# Patient Record
Sex: FEMALE | NOT HISPANIC OR LATINO
[De-identification: names, ages, dates, MRNs, and addresses within clinical notes are randomized per-mention and may not be internally consistent; named-entity substitution may affect disease eponyms.]

---

## 2020-06-30 PROBLEM — Z78.9 NON-SMOKER: Status: ACTIVE | Noted: 2020-06-30

## 2020-07-01 ENCOUNTER — APPOINTMENT (OUTPATIENT)
Dept: SURGICAL ONCOLOGY | Facility: CLINIC | Age: 40
End: 2020-07-01
Payer: MEDICAID

## 2020-07-01 VITALS
SYSTOLIC BLOOD PRESSURE: 113 MMHG | DIASTOLIC BLOOD PRESSURE: 76 MMHG | RESPIRATION RATE: 14 BRPM | HEART RATE: 87 BPM | OXYGEN SATURATION: 99 %

## 2020-07-01 VITALS — TEMPERATURE: 96.6 F

## 2020-07-01 DIAGNOSIS — Z78.9 OTHER SPECIFIED HEALTH STATUS: ICD-10-CM

## 2020-07-01 PROCEDURE — 99205 OFFICE O/P NEW HI 60 MIN: CPT

## 2020-07-01 NOTE — PHYSICAL EXAM
[Normal] : supple, no neck mass and thyroid not enlarged [Normal Supraclavicular Lymph Nodes] : normal supraclavicular lymph nodes [Normal Axillary Lymph Nodes] : normal axillary lymph nodes [Normal] : oriented to person, place and time, with appropriate affect [FreeTextEntry1] : DT was present [de-identified] : Vaguely bordered Right UIQ breast mass from 12 to 3, left wnl. [de-identified] : Normal S1, S2.  Regular rate and rhythm.  [de-identified] : Clear breath sounds bilaterally, normal respiratory effort.

## 2020-07-01 NOTE — HISTORY OF PRESENT ILLNESS
[de-identified] : 39 year-old female presents for an initial postop visit.  She was referred for breast imaging in June 2020 to evaluate a palpable right breast mass.  Mammogram revealed a 2.5 cm spiculated pass in the area of palpable concern with overlying skin thickening and retraction.  A 7 mm nodule overlies the lower portion of the right pectoralis muscle, possibly representing a satellite lesion or abnormal lymph node.  Prominent right axillary lymph nodes are present.  Breast ultrasound revealed a 2.5 cm mass at 2:00-3:00, 5 cmfn, corresponding to the mammographic mass.  In the right axillary tail there is a 1.3 cm nodule concerning for a satellite lesion vs. abnormal lymph node, as seem on mammogram as well.  There are abnormal axillary lymph nodes ranging up to 2.3 cm (BIRADS 5). \par \par Ultrasound-guided biopsy of both the mass and lymph node were performed, an fInal pathology was poorly differentiated invasive ductal carcinoma, metastatic to the right axillary lymph node (ER+KY+/HER2 equivocal, FISH pending).

## 2020-07-01 NOTE — CONSULT LETTER
[Dear  ___] : Dear  [unfilled], [Consult Letter:] : I had the pleasure of evaluating your patient, [unfilled]. [Sincerely,] : Sincerely, [Please see my note below.] : Please see my note below. [Consult Closing:] : Thank you very much for allowing me to participate in the care of this patient.  If you have any questions, please do not hesitate to contact me. [FreeTextEntry3] : Ben Prince M.D.\par  [FreeTextEntry2] : Grabiel Sherman MD

## 2020-07-01 NOTE — ASSESSMENT
[FreeTextEntry1] : IMP:\par Newly diagnosed right breast cancer metastatic to right axillary lymph node\par Question of satellite lesion in the right axillary tail\par \par The patient and I discussed the surgical management of breast cancer. I explained that breast cancer can be treated with 2 main surgical approaches. One is breast conserving therapy. The other is mastectomy with or without immediate reconstruction by a plastic surgeon. Breast conserving therapy involves wide excision of the involved area. Negative margins must be achieved with this wide excision. In addition, evaluation of the lymph nodes either with a sentinel lymph node biopsy or an axillary lymph node dissection may be required. This treatment usually requires postoperative radiation to the breast. Treatment with mastectomy also involves evaluation of the lymph node with a sentinel lymph node biopsy or axillary lymph node dissection. Post operative radiation therapy may be needed even after mastectomy in certain advanced cases.\par \par \par PLAN:\par MRI for extent of disease evaluation\par Medical oncology referral for ? neoadjuvant chemotherapy\par Genetic testing

## 2020-07-06 ENCOUNTER — OUTPATIENT (OUTPATIENT)
Dept: OUTPATIENT SERVICES | Facility: HOSPITAL | Age: 40
LOS: 1 days | Discharge: HOME | End: 2020-07-06

## 2020-07-06 DIAGNOSIS — Z85.3 PERSONAL HISTORY OF MALIGNANT NEOPLASM OF BREAST: ICD-10-CM

## 2020-07-07 ENCOUNTER — APPOINTMENT (OUTPATIENT)
Dept: HEMATOLOGY ONCOLOGY | Facility: CLINIC | Age: 40
End: 2020-07-07
Payer: MEDICAID

## 2020-07-07 PROCEDURE — 99205 OFFICE O/P NEW HI 60 MIN: CPT | Mod: 95

## 2020-07-13 ENCOUNTER — APPOINTMENT (OUTPATIENT)
Dept: NUCLEAR MEDICINE | Facility: IMAGING CENTER | Age: 40
End: 2020-07-13

## 2020-07-13 ENCOUNTER — APPOINTMENT (OUTPATIENT)
Dept: CARDIOLOGY | Facility: CLINIC | Age: 40
End: 2020-07-13

## 2020-07-14 ENCOUNTER — APPOINTMENT (OUTPATIENT)
Dept: HUMAN REPRODUCTION | Facility: CLINIC | Age: 40
End: 2020-07-14

## 2020-07-16 ENCOUNTER — APPOINTMENT (OUTPATIENT)
Dept: CARDIOLOGY | Facility: CLINIC | Age: 40
End: 2020-07-16
Payer: MEDICAID

## 2020-07-16 PROCEDURE — 93356 MYOCRD STRAIN IMG SPCKL TRCK: CPT

## 2020-07-16 PROCEDURE — 93306 TTE W/DOPPLER COMPLETE: CPT

## 2020-07-17 ENCOUNTER — APPOINTMENT (OUTPATIENT)
Dept: NUCLEAR MEDICINE | Facility: IMAGING CENTER | Age: 40
End: 2020-07-17
Payer: MEDICAID

## 2020-07-17 ENCOUNTER — RESULT REVIEW (OUTPATIENT)
Age: 40
End: 2020-07-17

## 2020-07-17 ENCOUNTER — APPOINTMENT (OUTPATIENT)
Dept: CT IMAGING | Facility: IMAGING CENTER | Age: 40
End: 2020-07-17
Payer: MEDICAID

## 2020-07-17 ENCOUNTER — OUTPATIENT (OUTPATIENT)
Dept: OUTPATIENT SERVICES | Facility: HOSPITAL | Age: 40
LOS: 1 days | End: 2020-07-17
Payer: MEDICAID

## 2020-07-17 DIAGNOSIS — C50.911 MALIGNANT NEOPLASM OF UNSPECIFIED SITE OF RIGHT FEMALE BREAST: ICD-10-CM

## 2020-07-17 PROCEDURE — 78306 BONE IMAGING WHOLE BODY: CPT

## 2020-07-17 PROCEDURE — 78830 RP LOCLZJ TUM SPECT W/CT 1: CPT | Mod: 26

## 2020-07-17 PROCEDURE — 74177 CT ABD & PELVIS W/CONTRAST: CPT

## 2020-07-17 PROCEDURE — A9561: CPT

## 2020-07-17 PROCEDURE — 71260 CT THORAX DX C+: CPT | Mod: 26

## 2020-07-17 PROCEDURE — 74177 CT ABD & PELVIS W/CONTRAST: CPT | Mod: 26

## 2020-07-17 PROCEDURE — 78830 RP LOCLZJ TUM SPECT W/CT 1: CPT

## 2020-07-17 PROCEDURE — 71260 CT THORAX DX C+: CPT

## 2020-07-17 PROCEDURE — 78306 BONE IMAGING WHOLE BODY: CPT | Mod: 26

## 2020-07-20 ENCOUNTER — APPOINTMENT (OUTPATIENT)
Dept: HEMATOLOGY ONCOLOGY | Facility: CLINIC | Age: 40
End: 2020-07-20
Payer: MEDICAID

## 2020-07-20 PROCEDURE — 99442: CPT

## 2020-07-21 ENCOUNTER — APPOINTMENT (OUTPATIENT)
Dept: CARDIOLOGY | Facility: CLINIC | Age: 40
End: 2020-07-21
Payer: MEDICAID

## 2020-07-21 DIAGNOSIS — C50.911 MALIGNANT NEOPLASM OF UNSPECIFIED SITE OF RIGHT FEMALE BREAST: ICD-10-CM

## 2020-07-21 DIAGNOSIS — Z00.00 ENCOUNTER FOR GENERAL ADULT MEDICAL EXAMINATION W/OUT ABNORMAL FINDINGS: ICD-10-CM

## 2020-07-21 PROCEDURE — 99202 OFFICE O/P NEW SF 15 MIN: CPT | Mod: 95

## 2020-07-22 ENCOUNTER — APPOINTMENT (OUTPATIENT)
Dept: MRI IMAGING | Facility: IMAGING CENTER | Age: 40
End: 2020-07-22
Payer: MEDICAID

## 2020-07-22 ENCOUNTER — OUTPATIENT (OUTPATIENT)
Dept: OUTPATIENT SERVICES | Facility: HOSPITAL | Age: 40
LOS: 1 days | End: 2020-07-22
Payer: MEDICAID

## 2020-07-22 ENCOUNTER — RESULT REVIEW (OUTPATIENT)
Age: 40
End: 2020-07-22

## 2020-07-22 ENCOUNTER — APPOINTMENT (OUTPATIENT)
Dept: HEMATOLOGY ONCOLOGY | Facility: CLINIC | Age: 40
End: 2020-07-22
Payer: MEDICAID

## 2020-07-22 DIAGNOSIS — C50.911 MALIGNANT NEOPLASM OF UNSPECIFIED SITE OF RIGHT FEMALE BREAST: ICD-10-CM

## 2020-07-22 PROCEDURE — 72197 MRI PELVIS W/O & W/DYE: CPT

## 2020-07-22 PROCEDURE — 74183 MRI ABD W/O CNTR FLWD CNTR: CPT | Mod: 26

## 2020-07-22 PROCEDURE — 74183 MRI ABD W/O CNTR FLWD CNTR: CPT

## 2020-07-22 PROCEDURE — 72197 MRI PELVIS W/O & W/DYE: CPT | Mod: 26

## 2020-07-22 PROCEDURE — 99442: CPT

## 2020-07-22 PROCEDURE — A9585: CPT

## 2020-07-23 ENCOUNTER — OUTPATIENT (OUTPATIENT)
Dept: OUTPATIENT SERVICES | Facility: HOSPITAL | Age: 40
LOS: 1 days | End: 2020-07-23
Payer: MEDICAID

## 2020-07-23 ENCOUNTER — RESULT REVIEW (OUTPATIENT)
Age: 40
End: 2020-07-23

## 2020-07-23 DIAGNOSIS — C50.919 MALIGNANT NEOPLASM OF UNSPECIFIED SITE OF UNSPECIFIED FEMALE BREAST: ICD-10-CM

## 2020-07-23 PROCEDURE — 88321 CONSLTJ&REPRT SLD PREP ELSWR: CPT

## 2020-07-28 ENCOUNTER — RESULT REVIEW (OUTPATIENT)
Age: 40
End: 2020-07-28

## 2020-07-28 ENCOUNTER — APPOINTMENT (OUTPATIENT)
Dept: HEMATOLOGY ONCOLOGY | Facility: CLINIC | Age: 40
End: 2020-07-28
Payer: COMMERCIAL

## 2020-07-28 ENCOUNTER — APPOINTMENT (OUTPATIENT)
Dept: INFUSION THERAPY | Facility: HOSPITAL | Age: 40
End: 2020-07-28

## 2020-07-28 VITALS
BODY MASS INDEX: 22.41 KG/M2 | RESPIRATION RATE: 17 BRPM | OXYGEN SATURATION: 100 % | WEIGHT: 137.79 LBS | HEART RATE: 97 BPM | DIASTOLIC BLOOD PRESSURE: 72 MMHG | HEIGHT: 65.91 IN | SYSTOLIC BLOOD PRESSURE: 128 MMHG | TEMPERATURE: 99.4 F

## 2020-07-28 PROBLEM — Z00.00 ENCOUNTER FOR PREVENTIVE HEALTH EXAMINATION: Status: ACTIVE | Noted: 2020-06-22

## 2020-07-28 PROBLEM — C50.911 BREAST CANCER, RIGHT: Status: ACTIVE | Noted: 2020-06-30

## 2020-07-28 LAB
BASOPHILS # BLD AUTO: 0.04 K/UL — SIGNIFICANT CHANGE UP (ref 0–0.2)
BASOPHILS NFR BLD AUTO: 0.4 % — SIGNIFICANT CHANGE UP (ref 0–2)
EOSINOPHIL # BLD AUTO: 0.04 K/UL — SIGNIFICANT CHANGE UP (ref 0–0.5)
EOSINOPHIL NFR BLD AUTO: 0.4 % — SIGNIFICANT CHANGE UP (ref 0–6)
HCT VFR BLD CALC: 37.4 % — SIGNIFICANT CHANGE UP (ref 34.5–45)
HGB BLD-MCNC: 11.5 G/DL — SIGNIFICANT CHANGE UP (ref 11.5–15.5)
IMM GRANULOCYTES NFR BLD AUTO: 0.4 % — SIGNIFICANT CHANGE UP (ref 0–1.5)
LYMPHOCYTES # BLD AUTO: 1.82 K/UL — SIGNIFICANT CHANGE UP (ref 1–3.3)
LYMPHOCYTES # BLD AUTO: 20.1 % — SIGNIFICANT CHANGE UP (ref 13–44)
MCHC RBC-ENTMCNC: 28.3 PG — SIGNIFICANT CHANGE UP (ref 27–34)
MCHC RBC-ENTMCNC: 30.7 GM/DL — LOW (ref 32–36)
MCV RBC AUTO: 92.1 FL — SIGNIFICANT CHANGE UP (ref 80–100)
MONOCYTES # BLD AUTO: 0.53 K/UL — SIGNIFICANT CHANGE UP (ref 0–0.9)
MONOCYTES NFR BLD AUTO: 5.8 % — SIGNIFICANT CHANGE UP (ref 2–14)
NEUTROPHILS # BLD AUTO: 6.6 K/UL — SIGNIFICANT CHANGE UP (ref 1.8–7.4)
NEUTROPHILS NFR BLD AUTO: 72.9 % — SIGNIFICANT CHANGE UP (ref 43–77)
NRBC # BLD: 0 /100 WBCS — SIGNIFICANT CHANGE UP (ref 0–0)
PLATELET # BLD AUTO: 323 K/UL — SIGNIFICANT CHANGE UP (ref 150–400)
RBC # BLD: 4.06 M/UL — SIGNIFICANT CHANGE UP (ref 3.8–5.2)
RBC # FLD: 13.5 % — SIGNIFICANT CHANGE UP (ref 10.3–14.5)
WBC # BLD: 9.07 K/UL — SIGNIFICANT CHANGE UP (ref 3.8–10.5)
WBC # FLD AUTO: 9.07 K/UL — SIGNIFICANT CHANGE UP (ref 3.8–10.5)

## 2020-07-28 PROCEDURE — 99215 OFFICE O/P EST HI 40 MIN: CPT | Mod: 25

## 2020-07-28 PROCEDURE — 93000 ELECTROCARDIOGRAM COMPLETE: CPT

## 2020-07-28 NOTE — ASSESSMENT
[FreeTextEntry1] : 38 yo F with new breast cancer Triple positive. chemo planned [facility]CHP?\par \par no cardiac risks- low risk patient for cardiac complication from  Her2 agents\par no role fro cardio protection at this time\par BP monitor, \par encouraged cv exercise during chemotherapy.\par Labs Lipids, Hbaic, cbc , cmp \par Baseline ekg before cancer treatment starts\par basleine echo then echo q 3 mths \par If edema, Chest pain, dyspnea, palpitations, dizziness etc develop please follow up asap.\par f/u 6mths \par \par \par CC: Dr JAMARCUS Poole

## 2020-07-28 NOTE — HISTORY OF PRESENT ILLNESS
[Home] : at home, [unfilled] , at the time of the visit. [Verbal consent obtained from patient] : the patient, [unfilled] [Medical Office: ([facility])___] : at the medical office located in  [FreeTextEntry1] : 38 yo F with newly diagnosed  right breast cancer  Triple positve stage IIIb\par chemo planned TCHP\par surgery proposed\par radiation planned\par \par Needs eggs to be extracted for fertility preservation for the future. \par \par no cardiac risk factors.\par ROS: Denies Chest pain, dyspnea, palpitations, dizziness or syncope.\par

## 2020-07-28 NOTE — REASON FOR VISIT
[Initial Evaluation] : an initial evaluation of [FreeTextEntry2] : Referred for consultation from Dr. Poole  for pre chemo evaluation.

## 2020-07-29 LAB
ALBUMIN SERPL ELPH-MCNC: 4.6 G/DL
ALP BLD-CCNC: 43 U/L
ALT SERPL-CCNC: 10 U/L
ANION GAP SERPL CALC-SCNC: 14 MMOL/L
APTT BLD: 33 SEC
AST SERPL-CCNC: 16 U/L
BILIRUB SERPL-MCNC: 0.3 MG/DL
BUN SERPL-MCNC: 9 MG/DL
CALCIUM SERPL-MCNC: 9.5 MG/DL
CHLORIDE SERPL-SCNC: 102 MMOL/L
CO2 SERPL-SCNC: 24 MMOL/L
CREAT SERPL-MCNC: 0.61 MG/DL
GLUCOSE SERPL-MCNC: 90 MG/DL
HBV CORE IGG+IGM SER QL: NONREACTIVE
HBV CORE IGM SER QL: NONREACTIVE
HBV SURFACE AB SER QL: REACTIVE
HBV SURFACE AG SER QL: NONREACTIVE
HCV AB SER QL: NONREACTIVE
HCV S/CO RATIO: 0.13 S/CO
HEPATITIS A IGG ANTIBODY: NONREACTIVE
INR PPP: 0.94 RATIO
POTASSIUM SERPL-SCNC: 4 MMOL/L
PROT SERPL-MCNC: 7.3 G/DL
PT BLD: 11.1 SEC
SODIUM SERPL-SCNC: 141 MMOL/L

## 2020-08-04 ENCOUNTER — APPOINTMENT (OUTPATIENT)
Dept: HEMATOLOGY ONCOLOGY | Facility: CLINIC | Age: 40
End: 2020-08-04

## 2020-08-04 ENCOUNTER — APPOINTMENT (OUTPATIENT)
Dept: INFUSION THERAPY | Facility: HOSPITAL | Age: 40
End: 2020-08-04

## 2020-08-10 ENCOUNTER — APPOINTMENT (OUTPATIENT)
Dept: OBGYN | Facility: CLINIC | Age: 40
End: 2020-08-10

## 2020-08-11 ENCOUNTER — APPOINTMENT (OUTPATIENT)
Dept: INFUSION THERAPY | Facility: HOSPITAL | Age: 40
End: 2020-08-11

## 2020-09-01 ENCOUNTER — APPOINTMENT (OUTPATIENT)
Dept: INFUSION THERAPY | Facility: HOSPITAL | Age: 40
End: 2020-09-01

## 2020-09-22 ENCOUNTER — APPOINTMENT (OUTPATIENT)
Dept: INFUSION THERAPY | Facility: HOSPITAL | Age: 40
End: 2020-09-22

## 2020-10-13 ENCOUNTER — APPOINTMENT (OUTPATIENT)
Dept: INFUSION THERAPY | Facility: HOSPITAL | Age: 40
End: 2020-10-13

## 2020-11-03 ENCOUNTER — APPOINTMENT (OUTPATIENT)
Dept: INFUSION THERAPY | Facility: HOSPITAL | Age: 40
End: 2020-11-03

## 2020-11-24 ENCOUNTER — APPOINTMENT (OUTPATIENT)
Dept: INFUSION THERAPY | Facility: HOSPITAL | Age: 40
End: 2020-11-24

## 2024-05-15 ENCOUNTER — APPOINTMENT (OUTPATIENT)
Dept: GASTROENTEROLOGY | Facility: CLINIC | Age: 44
End: 2024-05-15
Payer: MEDICAID

## 2024-05-15 VITALS
OXYGEN SATURATION: 99 % | RESPIRATION RATE: 16 BRPM | SYSTOLIC BLOOD PRESSURE: 117 MMHG | HEIGHT: 65 IN | BODY MASS INDEX: 24.98 KG/M2 | HEART RATE: 68 BPM | WEIGHT: 149.91 LBS | TEMPERATURE: 97.8 F | DIASTOLIC BLOOD PRESSURE: 83 MMHG

## 2024-05-15 DIAGNOSIS — R11.2 NAUSEA WITH VOMITING, UNSPECIFIED: ICD-10-CM

## 2024-05-15 PROCEDURE — 99204 OFFICE O/P NEW MOD 45 MIN: CPT

## 2024-05-15 RX ORDER — OMEPRAZOLE 20 MG/1
20 CAPSULE, DELAYED RELEASE ORAL
Qty: 30 | Refills: 5 | Status: ACTIVE | COMMUNITY
Start: 2024-05-15 | End: 1900-01-01

## 2024-05-15 NOTE — ASSESSMENT
[FreeTextEntry1] : 43F with a h/o breast cancer s/p mastectomy and reconstruction along with chemoRT who presents for symptoms of nausea and vomiting.   #N/v - most likely related to silent reflux i/s/o alendronate therapy as GI mucosal irritation is a common side effect - advised PPI trial with omeprazole 20 mg once daily x8 weeks   RTC 2-3 months   Monica Zaman MD Gastroenterology

## 2024-05-15 NOTE — HISTORY OF PRESENT ILLNESS
[FreeTextEntry1] : 43F with a h/o breast cancer s/p mastectomy and reconstruction along with chemoRT who presents for symptoms of nausea and vomiting. She states that if she eats some greasy foods or food that she does not normally eat, she will have an episode of nausea. This nausea becomes so unbearable and is associated with a headache. Does not have abd pain, though some discomfort is present. This prompts her to induce vomiting by gagging herself. This occurs once every few weeks. Her BM's are normal with diarrhea, constipation, melena, hematochezia. She has been taking alendronate for past year or two, and she feels this may be contributing.   Had EGD-colonoscopy a few months ago at [facility] [place]. The colonoscopy showed a few polyps, and she was told to rpt in 7 years. Her EGD biopsies returned with H pylori, and she was treated. She had stool testing confirm eradication.   Family Hx: no GI malignancy, CD, UC   Social Hx: never smoker, no EtOH, no recreational drugs, studying MSW at [employer]

## 2024-07-24 ENCOUNTER — APPOINTMENT (OUTPATIENT)
Dept: GASTROENTEROLOGY | Facility: CLINIC | Age: 44
End: 2024-07-24
Payer: MEDICAID

## 2024-07-24 VITALS
TEMPERATURE: 97.6 F | HEIGHT: 65 IN | SYSTOLIC BLOOD PRESSURE: 102 MMHG | BODY MASS INDEX: 25.83 KG/M2 | WEIGHT: 155 LBS | OXYGEN SATURATION: 99 % | DIASTOLIC BLOOD PRESSURE: 66 MMHG | HEART RATE: 86 BPM

## 2024-07-24 DIAGNOSIS — R11.2 NAUSEA WITH VOMITING, UNSPECIFIED: ICD-10-CM

## 2024-07-24 PROCEDURE — 99214 OFFICE O/P EST MOD 30 MIN: CPT

## 2024-07-24 NOTE — ASSESSMENT
[FreeTextEntry1] : 43F with a h/o breast cancer s/p mastectomy and reconstruction along with chemoRT who presents for follow up of nausea and vomiting.  #N/v - continue PPI once daily for now while on alendronate therapy - advised that if she is possibly stopping alendronate soon, then would begin tapering after discontinuation; however, if she is to remain on alendronate long term, then we can consider tapering at her next visit to possibly famotidine or off all therapy to monitor symptoms   RTC 3 months   Monica Zaman MD Gastroenterology

## 2024-07-24 NOTE — HISTORY OF PRESENT ILLNESS
[FreeTextEntry1] : 43F with a h/o breast cancer s/p mastectomy and reconstruction along with chemoRT who presents for follow up of nausea and vomiting. She has been feeling much better since starting PPI once daily. Nausea and vomiting have nearly resolved. She had an episode yesterday, but she thinks dietary indiscretions prompted that as she ate french fries, watermelon, and had coffee.   Prior HPI 5/15/24:  43F with a h/o breast cancer s/p mastectomy and reconstruction along with chemoRT who presents for symptoms of nausea and vomiting. She states that if she eats some greasy foods or food that she does not normally eat, she will have an episode of nausea. This nausea becomes so unbearable and is associated with a headache. Does not have abd pain, though some discomfort is present. This prompts her to induce vomiting by gagging herself. This occurs once every few weeks. Her BM's are normal with diarrhea, constipation, melena, hematochezia. She has been taking alendronate for past year or two, and she feels this may be contributing.  Had EGD-colonoscopy a few months ago at [facility] [place]. The colonoscopy showed a few polyps, and she was told to rpt in 7 years. Her EGD biopsies returned with H pylori, and she was treated. She had stool testing confirm eradication.  Family Hx: no GI malignancy, CD, UC  Social Hx: never smoker, no EtOH, no recreational drugs, studying MSW at [employer]

## 2024-08-27 ENCOUNTER — TRANSCRIPTION ENCOUNTER (OUTPATIENT)
Age: 44
End: 2024-08-27

## 2024-08-27 ENCOUNTER — APPOINTMENT (OUTPATIENT)
Dept: OPHTHALMOLOGY | Facility: CLINIC | Age: 44
End: 2024-08-27
Payer: MEDICAID

## 2024-08-27 ENCOUNTER — NON-APPOINTMENT (OUTPATIENT)
Age: 44
End: 2024-08-27

## 2024-08-27 PROCEDURE — 92004 COMPRE OPH EXAM NEW PT 1/>: CPT

## 2024-09-05 ENCOUNTER — APPOINTMENT (OUTPATIENT)
Dept: OPHTHALMOLOGY | Facility: CLINIC | Age: 44
End: 2024-09-05
Payer: MEDICAID

## 2024-09-05 ENCOUNTER — NON-APPOINTMENT (OUTPATIENT)
Age: 44
End: 2024-09-05

## 2024-09-05 PROCEDURE — 67840 REMOVE EYELID LESION: CPT | Mod: LT

## 2024-09-05 PROCEDURE — 99204 OFFICE O/P NEW MOD 45 MIN: CPT | Mod: 25

## 2024-09-05 PROCEDURE — 92285 EXTERNAL OCULAR PHOTOGRAPHY: CPT

## 2024-10-22 ENCOUNTER — APPOINTMENT (OUTPATIENT)
Dept: GASTROENTEROLOGY | Facility: CLINIC | Age: 44
End: 2024-10-22
Payer: MEDICAID

## 2024-10-22 DIAGNOSIS — R14.0 ABDOMINAL DISTENSION (GASEOUS): ICD-10-CM

## 2024-10-22 PROCEDURE — 99443: CPT

## 2024-11-14 ENCOUNTER — APPOINTMENT (OUTPATIENT)
Dept: DERMATOLOGY | Facility: CLINIC | Age: 44
End: 2024-11-14
Payer: MEDICAID

## 2024-11-14 DIAGNOSIS — L70.0 ACNE VULGARIS: ICD-10-CM

## 2024-11-14 DIAGNOSIS — D22.9 MELANOCYTIC NEVI, UNSPECIFIED: ICD-10-CM

## 2024-11-14 DIAGNOSIS — L82.1 OTHER SEBORRHEIC KERATOSIS: ICD-10-CM

## 2024-11-14 PROCEDURE — 99204 OFFICE O/P NEW MOD 45 MIN: CPT

## 2024-11-14 RX ORDER — CLINDAMYCIN PHOSPHATE 1 G/10ML
1 GEL TOPICAL TWICE DAILY
Qty: 1 | Refills: 1 | Status: ACTIVE | COMMUNITY
Start: 2024-11-14 | End: 1900-01-01

## 2024-11-19 ENCOUNTER — NON-APPOINTMENT (OUTPATIENT)
Age: 44
End: 2024-11-19

## 2024-11-19 ENCOUNTER — APPOINTMENT (OUTPATIENT)
Dept: OPHTHALMOLOGY | Facility: CLINIC | Age: 44
End: 2024-11-19

## 2024-11-19 PROCEDURE — 99204 OFFICE O/P NEW MOD 45 MIN: CPT

## 2024-11-19 PROCEDURE — 92285 EXTERNAL OCULAR PHOTOGRAPHY: CPT

## 2025-01-24 ENCOUNTER — NON-APPOINTMENT (OUTPATIENT)
Age: 45
End: 2025-01-24

## 2025-01-24 ENCOUNTER — APPOINTMENT (OUTPATIENT)
Dept: GASTROENTEROLOGY | Facility: CLINIC | Age: 45
End: 2025-01-24
Payer: MEDICAID

## 2025-01-24 VITALS
HEART RATE: 94 BPM | BODY MASS INDEX: 24.99 KG/M2 | DIASTOLIC BLOOD PRESSURE: 78 MMHG | OXYGEN SATURATION: 98 % | WEIGHT: 150 LBS | TEMPERATURE: 97.5 F | HEIGHT: 65 IN | SYSTOLIC BLOOD PRESSURE: 127 MMHG

## 2025-01-24 DIAGNOSIS — R11.2 NAUSEA WITH VOMITING, UNSPECIFIED: ICD-10-CM

## 2025-01-24 PROCEDURE — G2211 COMPLEX E/M VISIT ADD ON: CPT | Mod: NC

## 2025-01-24 PROCEDURE — 99214 OFFICE O/P EST MOD 30 MIN: CPT

## 2025-01-24 RX ORDER — ANASTROZOLE TABLETS 1 MG/1
1 TABLET ORAL
Refills: 0 | Status: ACTIVE | COMMUNITY

## 2025-02-27 ENCOUNTER — APPOINTMENT (OUTPATIENT)
Dept: DERMATOLOGY | Facility: CLINIC | Age: 45
End: 2025-02-27
Payer: MEDICAID

## 2025-02-27 DIAGNOSIS — L70.0 ACNE VULGARIS: ICD-10-CM

## 2025-02-27 DIAGNOSIS — L85.3 XEROSIS CUTIS: ICD-10-CM

## 2025-02-27 DIAGNOSIS — L71.9 ROSACEA, UNSPECIFIED: ICD-10-CM

## 2025-02-27 PROCEDURE — 99214 OFFICE O/P EST MOD 30 MIN: CPT | Mod: 95

## 2025-02-27 RX ORDER — TRETINOIN 1 MG/G
0.1 CREAM TOPICAL
Qty: 1 | Refills: 2 | Status: ACTIVE | COMMUNITY
Start: 2025-02-27 | End: 1900-01-01

## 2025-02-27 RX ORDER — SULFACETAMIDE SODIUM AND SULFUR 10; 5 MG/G; MG/G
9-4.5 RINSE TOPICAL
Qty: 1 | Refills: 2 | Status: ACTIVE | COMMUNITY
Start: 2025-02-27 | End: 1900-01-01

## 2025-02-27 RX ORDER — AMMONIUM LACTATE 12 %
12 CREAM (GRAM) TOPICAL
Qty: 1 | Refills: 5 | Status: ACTIVE | COMMUNITY
Start: 2025-02-27 | End: 1900-01-01

## 2025-04-25 ENCOUNTER — APPOINTMENT (OUTPATIENT)
Dept: GASTROENTEROLOGY | Facility: CLINIC | Age: 45
End: 2025-04-25
Payer: MEDICAID

## 2025-04-25 VITALS
HEIGHT: 65 IN | BODY MASS INDEX: 25.99 KG/M2 | TEMPERATURE: 97.2 F | WEIGHT: 156 LBS | HEART RATE: 104 BPM | OXYGEN SATURATION: 98 % | DIASTOLIC BLOOD PRESSURE: 76 MMHG | SYSTOLIC BLOOD PRESSURE: 108 MMHG

## 2025-04-25 DIAGNOSIS — R11.2 NAUSEA WITH VOMITING, UNSPECIFIED: ICD-10-CM

## 2025-04-25 DIAGNOSIS — R14.0 ABDOMINAL DISTENSION (GASEOUS): ICD-10-CM

## 2025-04-25 PROCEDURE — 99214 OFFICE O/P EST MOD 30 MIN: CPT

## 2025-05-15 ENCOUNTER — APPOINTMENT (OUTPATIENT)
Dept: OTOLARYNGOLOGY | Facility: CLINIC | Age: 45
End: 2025-05-15
Payer: MEDICAID

## 2025-05-15 ENCOUNTER — NON-APPOINTMENT (OUTPATIENT)
Age: 45
End: 2025-05-15

## 2025-05-15 VITALS
DIASTOLIC BLOOD PRESSURE: 79 MMHG | HEIGHT: 65 IN | TEMPERATURE: 96.6 F | BODY MASS INDEX: 25.71 KG/M2 | OXYGEN SATURATION: 98 % | WEIGHT: 154.32 LBS | SYSTOLIC BLOOD PRESSURE: 110 MMHG | HEART RATE: 79 BPM

## 2025-05-15 DIAGNOSIS — J34.829 NASAL VALVE COLLAPSE, UNSPECIFIED: ICD-10-CM

## 2025-05-15 DIAGNOSIS — J34.89 OTHER SPECIFIED DISORDERS OF NOSE AND NASAL SINUSES: ICD-10-CM

## 2025-05-15 DIAGNOSIS — Z87.09 PERSONAL HISTORY OF OTHER DISEASES OF THE RESPIRATORY SYSTEM: ICD-10-CM

## 2025-05-15 PROCEDURE — 31231 NASAL ENDOSCOPY DX: CPT

## 2025-05-15 PROCEDURE — 99204 OFFICE O/P NEW MOD 45 MIN: CPT | Mod: 25

## 2025-05-27 ENCOUNTER — APPOINTMENT (OUTPATIENT)
Dept: OTOLARYNGOLOGY | Facility: CLINIC | Age: 45
End: 2025-05-27

## 2025-08-05 ENCOUNTER — APPOINTMENT (OUTPATIENT)
Dept: OPHTHALMOLOGY | Facility: CLINIC | Age: 45
End: 2025-08-05
Payer: MEDICAID

## 2025-08-05 ENCOUNTER — NON-APPOINTMENT (OUTPATIENT)
Age: 45
End: 2025-08-05

## 2025-08-05 PROCEDURE — 92285 EXTERNAL OCULAR PHOTOGRAPHY: CPT

## 2025-08-05 PROCEDURE — 99213 OFFICE O/P EST LOW 20 MIN: CPT
